# Patient Record
Sex: FEMALE | Race: WHITE | NOT HISPANIC OR LATINO | URBAN - NONMETROPOLITAN AREA
[De-identification: names, ages, dates, MRNs, and addresses within clinical notes are randomized per-mention and may not be internally consistent; named-entity substitution may affect disease eponyms.]

---

## 2020-06-16 NOTE — PATIENT DISCUSSION
- Updated Rx for monovision contact lens provided (monovision, OD for near, nothing OS).  Also provided new Rx for bifocals

## 2021-03-11 ENCOUNTER — IMPORTED ENCOUNTER (OUTPATIENT)
Dept: URBAN - NONMETROPOLITAN AREA CLINIC 1 | Facility: CLINIC | Age: 71
End: 2021-03-11

## 2021-03-11 PROBLEM — H25.13: Noted: 2021-03-11

## 2021-03-11 PROCEDURE — 92004 COMPRE OPH EXAM NEW PT 1/>: CPT

## 2021-03-11 PROCEDURE — 92015 DETERMINE REFRACTIVE STATE: CPT

## 2021-03-22 NOTE — PROCEDURE NOTE: CLINICAL
PROCEDURE NOTE: Excision of Eyelid Lesion Right Upper Lid. Diagnosis: Eyelid Lesion, Benign. Anesthesia: Topical. Prep: Alcohol Prep/Wipe. Risks, benefits and alternatives discussed. Patient desires to proceed with excision of growth/lesion today. A drop of proparacaine was instilled in the involved eye. A tetracaine drop was used on a cotton tipped applicator and placed on the conjunctiva. The involved area was anesthetized using 1% lidocaine with epi. The lesion was excised using mini Westcotts and forceps and discarded. The wound was cauterized to achieve hemostasis. Erythromycin ophthalmic ointment was applied. Post op instructions were given to the patient. The patient tolerated the procedure well and left in good condition. The patient understands to call us for any concerns. Chilango Holly

## 2021-06-23 NOTE — PATIENT DISCUSSION
One week post op: great curve and shape, no infection, healing well, sutures intact and removed, switch to Aquaphor to upper eyelids x 7-10 days. Use Vitamin E oil/Skinuva QHS x 1 month to incisions after 10 days. Wear sunglasses and hat while outdoors. Avoid sun exposure. No restrictions in 5 days.

## 2021-07-27 NOTE — PATIENT DISCUSSION
- Updated Rx for monovision contact lens provided (monovision, OD for near, nothing OS).  Continue with current glasses for now

## 2021-07-29 NOTE — PROCEDURE NOTE: CLINICAL
PROCEDURE NOTE: Biopsy of Eyelid Right Upper Lid. Diagnosis: Eyelid Lesion, Benign. Anesthesia: Local. Risks, benefits and alternatives discussed. Patient desires to proceed with biopsy today. A drop of proparacaine was instilled in the involved eye. Involved area was prepped using alcohol wipe and anesthetized using 1% lidocaine with epi. Lesion was excised using mini Calixto scissors and forceps and placed in formalin to be sent for histopathology. Wound was cauterized to achieve hemostasis and erythromycin ophthalmic ointment was applied. The patient tolerated the procedure well and left in good condition. Post op instructions were given to the patient. The patient understands to call us for any concerns. Marry Alvarez

## 2021-07-29 NOTE — PATIENT DISCUSSION
Patient understands condition, prognosis and need for follow up care. [Follow-Up] : a follow-up visit [Asthma] : asthma [Shortness of Breath] : shortness of Breath [Cough] : cough

## 2021-07-29 NOTE — PATIENT DISCUSSION
How Severe Is Your Nodule?: mild The patient was here for a vision care examination. There were no untoward findings noted. Repeat evaluation in one to two years is indicated. Is This A New Presentation, Or A Follow-Up?: Nodules

## 2022-04-09 ASSESSMENT — VISUAL ACUITY
OS_CC: 20/30-1
OD_CC: 20/40-1

## 2022-04-09 ASSESSMENT — TONOMETRY
OS_IOP_MMHG: 13
OD_IOP_MMHG: 13

## 2022-06-30 ENCOUNTER — EMERGENCY VISIT (OUTPATIENT)
Dept: RURAL CLINIC 1 | Facility: CLINIC | Age: 72
End: 2022-06-30

## 2022-06-30 DIAGNOSIS — H25.13: ICD-10-CM

## 2022-06-30 DIAGNOSIS — H43.811: ICD-10-CM

## 2022-06-30 PROCEDURE — 92014 COMPRE OPH EXAM EST PT 1/>: CPT

## 2022-06-30 ASSESSMENT — TONOMETRY
OD_IOP_MMHG: 14
OS_IOP_MMHG: 13

## 2022-06-30 ASSESSMENT — VISUAL ACUITY
OS_SC: 20/20
OU_SC: 20/20
OD_SC: 20/25-2

## 2022-08-29 NOTE — PATIENT DISCUSSION
[facility] monitoring, AREDS2 vitamins, no smoking, green leafy vegetables discussed.

## 2022-08-29 NOTE — PATIENT DISCUSSION
New Contact Lens Rx given, monovision for OD only. Currently in a +3.75 but noticing some issues with intermediate. New Rx is for +3.50. Does not  yet want to be corrected for distance OS.

## 2023-10-10 ENCOUNTER — ESTABLISHED PATIENT (OUTPATIENT)
Dept: RURAL CLINIC 1 | Facility: CLINIC | Age: 73
End: 2023-10-10

## 2023-10-10 DIAGNOSIS — H25.13: ICD-10-CM

## 2023-10-10 DIAGNOSIS — H43.811: ICD-10-CM

## 2023-10-10 PROCEDURE — 92014 COMPRE OPH EXAM EST PT 1/>: CPT

## 2023-10-10 ASSESSMENT — VISUAL ACUITY
OD_BAT: 20/40
OS_CC: 20/25
OU_CC: 20/25
OS_PH: 20/30
OS_BAT: 20/40
OU_SC: 20/25+1
OD_PH: 20/20-1
OS_SC: 20/40
OD_SC: 20/30+1
OD_CC: 20/25

## 2023-10-10 ASSESSMENT — TONOMETRY
OS_IOP_MMHG: 15
OD_IOP_MMHG: 15

## 2024-10-10 ENCOUNTER — COMPREHENSIVE EXAM (OUTPATIENT)
Dept: RURAL CLINIC 1 | Facility: CLINIC | Age: 74
End: 2024-10-10

## 2024-10-10 DIAGNOSIS — H43.813: ICD-10-CM

## 2024-10-10 DIAGNOSIS — H25.13: ICD-10-CM

## 2024-10-10 PROCEDURE — 92014 COMPRE OPH EXAM EST PT 1/>: CPT
